# Patient Record
Sex: FEMALE | Race: WHITE | NOT HISPANIC OR LATINO | URBAN - METROPOLITAN AREA
[De-identification: names, ages, dates, MRNs, and addresses within clinical notes are randomized per-mention and may not be internally consistent; named-entity substitution may affect disease eponyms.]

---

## 2018-04-27 NOTE — PATIENT DISCUSSION
"""Discussed dermatochalasis symptoms with patient. Discussed possible blepharoplasty if vision affected in the future.  Schedule Ptosis VF and photo's

## 2018-04-27 NOTE — PATIENT DISCUSSION
"""""""Discussed dermatochalasis symptoms with patient. Discussed possible blepharoplasty if vision affected in the future.  Schedule Ptosis VF and photo's

## 2018-11-16 NOTE — PATIENT DISCUSSION
"""Recommend Right Levator Repair 12/04/2018 at [facility].  Discussed with patient RBOBINNA

## 2022-04-14 ENCOUNTER — NEW PATIENT (OUTPATIENT)
Dept: URBAN - METROPOLITAN AREA CLINIC 50 | Facility: CLINIC | Age: 47
End: 2022-04-14

## 2022-04-14 DIAGNOSIS — Z01.01: ICD-10-CM

## 2022-04-14 DIAGNOSIS — T15.12XA: ICD-10-CM

## 2022-04-14 DIAGNOSIS — H52.4: ICD-10-CM

## 2022-04-14 PROCEDURE — 92015 DETERMINE REFRACTIVE STATE: CPT

## 2022-04-14 PROCEDURE — 92004 COMPRE OPH EXAM NEW PT 1/>: CPT

## 2022-04-14 RX ORDER — BRIMONIDINE TARTRATE 0.25 MG/ML: 1 SOLUTION/ DROPS OPHTHALMIC AS NEEDED

## 2022-04-14 ASSESSMENT — KERATOMETRY
OS_AXISANGLE2_DEGREES: 84
OS_AXISANGLE_DEGREES: 174
OD_AXISANGLE2_DEGREES: 117
OS_K2POWER_DIOPTERS: 46.25
OD_K1POWER_DIOPTERS: 44.50
OD_K2POWER_DIOPTERS: 45.00
OS_K1POWER_DIOPTERS: 45.00
OD_AXISANGLE_DEGREES: 027

## 2022-04-14 ASSESSMENT — VISUAL ACUITY
OS_SC: 20/70-1
OD_SC: 20/70
OD_GLARE: 20/25
OS_SC: J1@14IN
OS_GLARE: 20/25
OU_SC: J1+@14IN
OU_SC: 20/60-1
OD_SC: J1+@14IN
OD_PH: 20/25-2
OD_GLARE: 20/40
OS_GLARE: 20/40
OS_PH: 20/30

## 2022-04-14 ASSESSMENT — TONOMETRY
OS_IOP_MMHG: 16
OD_IOP_MMHG: 14

## 2022-04-14 NOTE — PATIENT DISCUSSION
Patient interested in wearing contact lenses vs glasses. Will have patient schedule contact lens fitting.

## 2022-05-09 NOTE — PATIENT DISCUSSION
CATARACT SURGERY PLANNER - TORIC IOL/+FEMTO: Phacoemulsification with IOL: Eye: OD|DOS: 5/19/2022|Model: [id number]|Power: 18. 0|Target: PLANO|Femto: YES|Axis: 5°|Visc: DUET|Omidria: YES|10% Phenylephrine: YES|Epi-shugarcaine: YES|Phaco Setting: YES|Notes: Plan: DIU @ [facility]. Hx: faint ERM OS. DILATES to 6.7MM.

## 2022-05-24 NOTE — PATIENT DISCUSSION
CATARACT SURGERY PLANNER - TORIC IOL/+FEMTO: Phacoemulsification with IOL: Eye: OS|DOS: 6/16/2022|Model: [id number]|Power: 19. 0|Target: PLANO|Femto: YES|Axis: 175°|Visc: DUET|Omidria: YES|10% Phenylephrine: YES|Epi-shugarcaine: YES|Phaco Setting: STD|Notes: PLAN: DIU @ [facility]. HX: FAINT ERM OS. DILATED PUPIL: 6.5-7MM.

## 2022-07-18 NOTE — PROCEDURE NOTE: CLINICAL
PROCEDURE NOTE: YAG Capsulotomy OD. Diagnosis: Posterior Capsular Opacification (PCO). Prior to treatment, the risks/benefits/alternatives were discussed. The patient wished to proceed with procedure. Consent was signed. Proparacaine and brominidine were placed into the operative eye after the eye was dilated. Power = 1.7mJ. Number of pulses = 17. Patient tolerated procedure well and there were no complications. Post Laser instructions given. Matty Barnes

## 2022-07-18 NOTE — PATIENT DISCUSSION
Yag Cap OD performed today with patient's signed consent. Will start patient on Prednisolone Acetate BID x7 days, then QD x7 days, then stop.

## 2022-07-18 NOTE — PATIENT DISCUSSION
Discharge Information    IV Discontiued Time:  NA    Amount of Fluid Infused:  NA    Discharge Criteria = When patient returns to baseline or as per MD order    Consciousness:  Pt is fully awake    Circulation:  BP +/- 20% of pre-procedure level    Respiration:  Patient is able to breathe deeply    O2 Sat:  Patient is able to maintain O2 Sat >92% on room air    Activity:  Moves 4 extremities on command    Ambulation:  Patient is able to stand and walk or stand and pivot into wheelchair    Dressing:  Clean/dry or No Dressing    Notes:   Discharge instructions and AVS given to patient    Patient meets criteria for discharge?  YES    Admitted to PCU?  No    Responsible adult present to accompany patient home?  Yes    Signature/Title:    Bree Gonzales RN  RN Care Coordinator  [facility]     Doing well. Taper drops as instructed.

## 2023-04-13 ENCOUNTER — COMPREHENSIVE EXAM (OUTPATIENT)
Dept: URBAN - METROPOLITAN AREA CLINIC 50 | Facility: CLINIC | Age: 48
End: 2023-04-13

## 2023-04-13 DIAGNOSIS — Z01.01: ICD-10-CM

## 2023-04-13 DIAGNOSIS — H52.4: ICD-10-CM

## 2023-04-13 PROCEDURE — 92014 COMPRE OPH EXAM EST PT 1/>: CPT

## 2023-04-13 PROCEDURE — 92015 DETERMINE REFRACTIVE STATE: CPT

## 2023-04-13 ASSESSMENT — KERATOMETRY
OS_K2POWER_DIOPTERS: 46.00
OD_K2POWER_DIOPTERS: 45.00
OS_AXISANGLE_DEGREES: 178
OD_K1POWER_DIOPTERS: 44.25
OS_K1POWER_DIOPTERS: 45.00
OD_AXISANGLE_DEGREES: 20
OS_AXISANGLE2_DEGREES: 88
OD_AXISANGLE2_DEGREES: 110

## 2023-04-13 ASSESSMENT — TONOMETRY
OD_IOP_MMHG: 13
OS_IOP_MMHG: 14

## 2023-04-13 ASSESSMENT — VISUAL ACUITY
OD_SC: 20/60 BLURRY
OS_PH: 20/40
OD_PH: 20/40
OS_GLARE: 20/70
OD_GLARE: 20/40
OU_SC: 20/50-1
OS_GLARE: 20/40
OD_GLARE: 20/60

## 2024-04-02 ENCOUNTER — APPOINTMENT (RX ONLY)
Dept: URBAN - METROPOLITAN AREA CLINIC 86 | Facility: CLINIC | Age: 49
Setting detail: DERMATOLOGY
End: 2024-04-02

## 2024-04-02 DIAGNOSIS — L82.1 OTHER SEBORRHEIC KERATOSIS: ICD-10-CM

## 2024-04-02 DIAGNOSIS — R23.3 SPONTANEOUS ECCHYMOSES: ICD-10-CM

## 2024-04-02 DIAGNOSIS — D22 MELANOCYTIC NEVI: ICD-10-CM

## 2024-04-02 DIAGNOSIS — Z12.83 ENCOUNTER FOR SCREENING FOR MALIGNANT NEOPLASM OF SKIN: ICD-10-CM

## 2024-04-02 DIAGNOSIS — L81.4 OTHER MELANIN HYPERPIGMENTATION: ICD-10-CM

## 2024-04-02 PROBLEM — D22.5 MELANOCYTIC NEVI OF TRUNK: Status: ACTIVE | Noted: 2024-04-02

## 2024-04-02 PROCEDURE — ? FULL BODY SKIN EXAM

## 2024-04-02 PROCEDURE — ? TREATMENT REGIMEN

## 2024-04-02 PROCEDURE — ? COUNSELING

## 2024-04-02 PROCEDURE — 99203 OFFICE O/P NEW LOW 30 MIN: CPT

## 2024-04-02 PROCEDURE — ? DIAGNOSIS COMMENT

## 2024-04-02 ASSESSMENT — LOCATION SIMPLE DESCRIPTION DERM
LOCATION SIMPLE: RIGHT UPPER BACK
LOCATION SIMPLE: LEFT FOREARM
LOCATION SIMPLE: LEFT HAND
LOCATION SIMPLE: CHEST
LOCATION SIMPLE: LEFT UPPER BACK
LOCATION SIMPLE: RIGHT FOREARM

## 2024-04-02 ASSESSMENT — LOCATION ZONE DERM
LOCATION ZONE: ARM
LOCATION ZONE: HAND
LOCATION ZONE: TRUNK

## 2024-04-02 ASSESSMENT — LOCATION DETAILED DESCRIPTION DERM
LOCATION DETAILED: RIGHT PROXIMAL DORSAL FOREARM
LOCATION DETAILED: LEFT PROXIMAL DORSAL FOREARM
LOCATION DETAILED: LEFT ULNAR PALM
LOCATION DETAILED: RIGHT MID-UPPER BACK
LOCATION DETAILED: LEFT LATERAL SUPERIOR CHEST
LOCATION DETAILED: LEFT MID-UPPER BACK

## 2024-04-02 NOTE — PROCEDURE: COUNSELING
Detail Level: Generalized
Detail Level: Zone
Detail Level: Detailed
Patient Specific Counseling (Will Not Stick From Patient To Patient): Pt advised to f/u if not resolved in 3 weeks

## 2024-04-02 NOTE — PROCEDURE: DIAGNOSIS COMMENT
Comment: Blood blister for 2 days. If still present in 3 weeks then needs biopsy. Patient instructed to return
Render Risk Assessment In Note?: no
Detail Level: Simple